# Patient Record
Sex: MALE | Race: BLACK OR AFRICAN AMERICAN | URBAN - METROPOLITAN AREA
[De-identification: names, ages, dates, MRNs, and addresses within clinical notes are randomized per-mention and may not be internally consistent; named-entity substitution may affect disease eponyms.]

---

## 2023-06-29 ENCOUNTER — APPOINTMENT (RX ONLY)
Dept: URBAN - METROPOLITAN AREA CLINIC 203 | Facility: CLINIC | Age: 38
Setting detail: DERMATOLOGY
End: 2023-06-29

## 2023-06-29 DIAGNOSIS — A63.0 ANOGENITAL (VENEREAL) WARTS: ICD-10-CM | Status: INADEQUATELY CONTROLLED

## 2023-06-29 PROCEDURE — ? LIQUID NITROGEN GENITALS

## 2023-06-29 PROCEDURE — 54056 CRYOSURGERY PENIS LESION(S): CPT

## 2023-06-29 PROCEDURE — ? PRESCRIPTION MEDICATION MANAGEMENT

## 2023-06-29 PROCEDURE — ? COUNSELING

## 2023-06-29 PROCEDURE — ? PRESCRIPTION

## 2023-06-29 RX ORDER — IMIQUIMOD 12.5 MG/.25G
5% CREAM TOPICAL
Qty: 24 | Refills: 0 | Status: ERX | COMMUNITY
Start: 2023-06-29

## 2023-06-29 RX ADMIN — IMIQUIMOD 5%: 12.5 CREAM TOPICAL at 00:00

## 2023-06-29 ASSESSMENT — LOCATION ZONE DERM: LOCATION ZONE: PENIS

## 2023-06-29 ASSESSMENT — LOCATION DETAILED DESCRIPTION DERM: LOCATION DETAILED: LEFT DORSAL SHAFT OF PENIS

## 2023-06-29 ASSESSMENT — LOCATION SIMPLE DESCRIPTION DERM: LOCATION SIMPLE: PENIS

## 2023-06-29 NOTE — PROCEDURE: PRESCRIPTION MEDICATION MANAGEMENT
Render In Strict Bullet Format?: No
Detail Level: Zone
Plan: Risk of hypo pigmentation discussed with patient in detail. Patient agreed to treatment.
Initiate Treatment: imiquimod 5 % topical cream packet: Apply once daily three times a week to AA on genitals apply at night and wash off in the morning.\\n\\nGardasil vaccine recommended